# Patient Record
Sex: FEMALE
[De-identification: names, ages, dates, MRNs, and addresses within clinical notes are randomized per-mention and may not be internally consistent; named-entity substitution may affect disease eponyms.]

---

## 2022-06-25 ENCOUNTER — APPOINTMENT (OUTPATIENT)
Dept: AFTER HOURS CARE | Facility: EMERGENCY ROOM | Age: 32
End: 2022-06-25
Payer: COMMERCIAL

## 2022-06-26 DIAGNOSIS — T14.8XXA OTHER INJURY OF UNSPECIFIED BODY REGION, INITIAL ENCOUNTER: ICD-10-CM

## 2022-06-26 PROBLEM — Z00.00 ENCOUNTER FOR PREVENTIVE HEALTH EXAMINATION: Status: ACTIVE | Noted: 2022-06-26

## 2022-06-26 PROCEDURE — 99204 OFFICE O/P NEW MOD 45 MIN: CPT | Mod: 95

## 2022-06-26 RX ORDER — CEPHALEXIN 500 MG/1
500 CAPSULE ORAL 3 TIMES DAILY
Qty: 21 | Refills: 0 | Status: ACTIVE | COMMUNITY
Start: 2022-06-26 | End: 1900-01-01

## 2022-06-26 NOTE — ASSESSMENT
[FreeTextEntry1] : Pt with possible infected blister. tD UTD as per pt.Long convo regarding leg sx which are vague. I explained that this could be something serious, like a blood clot, but she is convinced these sx are psychosomatic and as we disicussed they began to change nad disappear. Nonetheless, I recommended the pt seek in person care but she wants to wait for a day. Gave her recs thaat she absolutely needs to seek care for true weakness, any numbness, swelling etc..

## 2022-06-26 NOTE — PHYSICAL EXAM
[No Acute Distress] : no acute distress [No Respiratory Distress] : no respiratory distress  [No Joint Swelling] : no joint swelling [Grossly Normal Strength/Tone] : grossly normal strength/tone [de-identified] : blister top 2nd L toe. No calf ttp swelling - homans

## 2022-06-26 NOTE — HISTORY OF PRESENT ILLNESS
[Home] : at home, [unfilled] , at the time of the visit. [Other Location: e.g. Home (Enter Location, City,State)___] : at [unfilled] [Verbal consent obtained from patient] : the patient, [unfilled] [FreeTextEntry8] : Pt was wearing tight shoes a few days ago developed a blister on top of second toe. Today toe was swollen and blister popped and pt having pain around blister. Minimal redness. No fever or warmth. Swelling at base of toe. Pt also having a weird feeling in leg that she cant describe but began afterweards. A rubbery feeling of her hamstring. No calf pain, swelling or ttp